# Patient Record
Sex: FEMALE | URBAN - METROPOLITAN AREA
[De-identification: names, ages, dates, MRNs, and addresses within clinical notes are randomized per-mention and may not be internally consistent; named-entity substitution may affect disease eponyms.]

---

## 2021-09-02 ENCOUNTER — OFFICE VISIT (OUTPATIENT)
Dept: URBAN - METROPOLITAN AREA CLINIC 24 | Facility: CLINIC | Age: 86
End: 2021-09-02
Payer: COMMERCIAL

## 2021-09-02 DIAGNOSIS — G43.101 MIGRAINE WITH AURA, NOT INTRACTABLE, WITH STATUS MIGRAINOSUS: Primary | ICD-10-CM

## 2021-09-02 DIAGNOSIS — H43.813 VITREOUS DEGENERATION, BILATERAL: ICD-10-CM

## 2021-09-02 PROCEDURE — 92134 CPTRZ OPH DX IMG PST SGM RTA: CPT | Performed by: OPHTHALMOLOGY

## 2021-09-02 PROCEDURE — 99204 OFFICE O/P NEW MOD 45 MIN: CPT | Performed by: OPHTHALMOLOGY

## 2021-09-02 ASSESSMENT — INTRAOCULAR PRESSURE
OD: 12
OS: 16

## 2021-09-02 NOTE — IMPRESSION/PLAN
Impression: Ocular migraine with aura Plan: CLASSIC description. Pt was searching for words but stated: She states it's like kaleidescope, in black and white. See's it even when she closes her eye. Jagged images flickering like scratches and shimmering in the vision. Shimmering lines in the vision that come and go. Pressure in the head, but not a splitting headache. Shimmering, angular-shaped positive scotoma peripheral, spreading, changing, then resolving. D/w pt headache, ocular migraine and NML retinal exam (incl periph exam showing NO tear /RD). F/u PCP or Neurology. Retina stable.   RETINA PRN
   [facility] Dr. Allyssa Mejia